# Patient Record
Sex: MALE | Race: OTHER
[De-identification: names, ages, dates, MRNs, and addresses within clinical notes are randomized per-mention and may not be internally consistent; named-entity substitution may affect disease eponyms.]

---

## 2020-06-19 ENCOUNTER — HOSPITAL ENCOUNTER (EMERGENCY)
Dept: HOSPITAL 54 - ER | Age: 50
Discharge: LEFT BEFORE DISCHARGE | End: 2020-06-19
Payer: COMMERCIAL

## 2020-06-19 VITALS — BODY MASS INDEX: 27.47 KG/M2 | HEIGHT: 67 IN | WEIGHT: 175 LBS

## 2020-06-19 VITALS — DIASTOLIC BLOOD PRESSURE: 98 MMHG | SYSTOLIC BLOOD PRESSURE: 142 MMHG

## 2020-06-19 DIAGNOSIS — I10: ICD-10-CM

## 2020-06-19 DIAGNOSIS — Z79.899: ICD-10-CM

## 2020-06-19 DIAGNOSIS — Z86.73: ICD-10-CM

## 2020-06-19 DIAGNOSIS — I21.4: Primary | ICD-10-CM

## 2020-06-19 DIAGNOSIS — R51: ICD-10-CM

## 2020-06-19 DIAGNOSIS — R07.89: ICD-10-CM

## 2020-06-19 LAB
BASOPHILS # BLD AUTO: 0.1 /CMM (ref 0–0.2)
BASOPHILS NFR BLD AUTO: 0.7 % (ref 0–2)
BUN SERPL-MCNC: 11 MG/DL (ref 7–18)
CALCIUM SERPL-MCNC: 8.5 MG/DL (ref 8.5–10.1)
CHLORIDE SERPL-SCNC: 103 MMOL/L (ref 98–107)
CO2 SERPL-SCNC: 32 MMOL/L (ref 21–32)
CREAT SERPL-MCNC: 0.7 MG/DL (ref 0.6–1.3)
EOSINOPHIL NFR BLD AUTO: 3.7 % (ref 0–6)
GLUCOSE SERPL-MCNC: 109 MG/DL (ref 74–106)
HCT VFR BLD AUTO: 44 % (ref 39–51)
HGB BLD-MCNC: 14.8 G/DL (ref 13.5–17.5)
LYMPHOCYTES NFR BLD AUTO: 1 /CMM (ref 0.8–4.8)
LYMPHOCYTES NFR BLD AUTO: 13.4 % (ref 20–44)
MCHC RBC AUTO-ENTMCNC: 34 G/DL (ref 31–36)
MCV RBC AUTO: 90 FL (ref 80–96)
MONOCYTES NFR BLD AUTO: 0.6 /CMM (ref 0.1–1.3)
MONOCYTES NFR BLD AUTO: 8.5 % (ref 2–12)
NEUTROPHILS # BLD AUTO: 5.3 /CMM (ref 1.8–8.9)
NEUTROPHILS NFR BLD AUTO: 73.7 % (ref 43–81)
NT-PROBNP SERPL-MCNC: 207 PG/ML (ref 0–125)
PLATELET # BLD AUTO: 158 /CMM (ref 150–450)
POTASSIUM SERPL-SCNC: 4 MMOL/L (ref 3.5–5.1)
RBC # BLD AUTO: 4.85 MIL/UL (ref 4.5–6)
SODIUM SERPL-SCNC: 138 MMOL/L (ref 136–145)
WBC NRBC COR # BLD AUTO: 7.2 K/UL (ref 4.3–11)

## 2020-06-19 PROCEDURE — 36415 COLL VENOUS BLD VENIPUNCTURE: CPT

## 2020-06-19 PROCEDURE — 80048 BASIC METABOLIC PNL TOTAL CA: CPT

## 2020-06-19 PROCEDURE — 85730 THROMBOPLASTIN TIME PARTIAL: CPT

## 2020-06-19 PROCEDURE — 83880 ASSAY OF NATRIURETIC PEPTIDE: CPT

## 2020-06-19 PROCEDURE — 84484 ASSAY OF TROPONIN QUANT: CPT

## 2020-06-19 PROCEDURE — 70450 CT HEAD/BRAIN W/O DYE: CPT

## 2020-06-19 PROCEDURE — 85025 COMPLETE CBC W/AUTO DIFF WBC: CPT

## 2020-06-19 PROCEDURE — 93005 ELECTROCARDIOGRAM TRACING: CPT

## 2020-06-19 PROCEDURE — 71045 X-RAY EXAM CHEST 1 VIEW: CPT

## 2020-06-19 PROCEDURE — 99291 CRITICAL CARE FIRST HOUR: CPT

## 2020-06-19 RX ADMIN — SODIUM CHLORIDE ONE ML: 9 INJECTION, SOLUTION INTRAVENOUS at 11:46

## 2020-06-19 NOTE — NUR
SPOKE TO ELADIO GAUTAM FROM [facility]. WANTS US TO FAX CLINICALS AND FACESHEET TO 
[phone].



GOOD CALL BACK NUMBER FOR DAIN [phone].

## 2020-06-19 NOTE — NUR
Patient does not wish to proceed with medical care recommended by Dr. Goncalves. 
Patient given information related to possible complications, up to and 
including death, which could occur as a result of leaving the hospital at this 
time. Patient verbalizes understanding of risks involved due to leaving against 
medical advice. Patient has signed AMA form.
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
## 2020-06-19 NOTE — NUR
CM IRIS FROM [facility] [phone]. CM CALLED TO GIVE BED ASSIGNMENT. PT 
GOING -A. NUMBER FOR REPORT [phone].